# Patient Record
Sex: FEMALE | Race: AMERICAN INDIAN OR ALASKA NATIVE
[De-identification: names, ages, dates, MRNs, and addresses within clinical notes are randomized per-mention and may not be internally consistent; named-entity substitution may affect disease eponyms.]

---

## 2017-07-06 ENCOUNTER — HOSPITAL ENCOUNTER (EMERGENCY)
Dept: HOSPITAL 5 - ED | Age: 32
LOS: 1 days | Discharge: LEFT BEFORE DISCHARGE | End: 2017-07-07
Payer: SELF-PAY

## 2017-07-06 VITALS — SYSTOLIC BLOOD PRESSURE: 116 MMHG | DIASTOLIC BLOOD PRESSURE: 78 MMHG

## 2017-07-06 DIAGNOSIS — R10.9: Primary | ICD-10-CM

## 2017-07-06 DIAGNOSIS — M25.572: ICD-10-CM

## 2017-07-06 DIAGNOSIS — Z53.21: ICD-10-CM

## 2017-07-06 LAB
ALBUMIN SERPL-MCNC: 4.2 G/DL (ref 3.9–5)
ALBUMIN/GLOB SERPL: 1.4 %
ALP SERPL-CCNC: 61 UNITS/L (ref 35–129)
ALT SERPL-CCNC: 10 UNITS/L (ref 7–56)
ANION GAP SERPL CALC-SCNC: 16 MMOL/L
BASOPHILS NFR BLD AUTO: 0.8 % (ref 0–1.8)
BILIRUB SERPL-MCNC: < 0.2 MG/DL (ref 0.1–1.2)
BUN SERPL-MCNC: 12 MG/DL (ref 7–17)
BUN/CREAT SERPL: 15 %
CALCIUM SERPL-MCNC: 9.2 MG/DL (ref 8.4–10.2)
CHLORIDE SERPL-SCNC: 101.5 MMOL/L (ref 98–107)
CO2 SERPL-SCNC: 26 MMOL/L (ref 22–30)
EOSINOPHIL NFR BLD AUTO: 1.3 % (ref 0–4.3)
GLUCOSE SERPL-MCNC: 100 MG/DL (ref 65–100)
HCT VFR BLD CALC: 39.6 % (ref 30.3–42.9)
HGB BLD-MCNC: 12.7 GM/DL (ref 10.1–14.3)
LIPASE SERPL-CCNC: 44 UNITS/L (ref 13–60)
MCH RBC QN AUTO: 28 PG (ref 28–32)
MCHC RBC AUTO-ENTMCNC: 32 % (ref 30–34)
MCV RBC AUTO: 86 FL (ref 79–97)
PLATELET # BLD: 334 K/MM3 (ref 140–440)
POTASSIUM SERPL-SCNC: 4.1 MMOL/L (ref 3.6–5)
PROT SERPL-MCNC: 7.3 G/DL (ref 6.3–8.2)
RBC # BLD AUTO: 4.61 M/MM3 (ref 3.65–5.03)
SODIUM SERPL-SCNC: 139 MMOL/L (ref 137–145)
WBC # BLD AUTO: 5.1 K/MM3 (ref 4.5–11)

## 2017-07-06 PROCEDURE — 80053 COMPREHEN METABOLIC PANEL: CPT

## 2017-07-06 PROCEDURE — 85025 COMPLETE CBC W/AUTO DIFF WBC: CPT

## 2017-07-06 PROCEDURE — 93010 ELECTROCARDIOGRAM REPORT: CPT

## 2017-07-06 PROCEDURE — 83690 ASSAY OF LIPASE: CPT

## 2017-07-06 PROCEDURE — 36415 COLL VENOUS BLD VENIPUNCTURE: CPT

## 2017-07-06 PROCEDURE — 93005 ELECTROCARDIOGRAM TRACING: CPT

## 2017-07-07 NOTE — ED ELOPEMENT REVIEW
ED Pt Elopement review





- Results review


Lab results: 





 Laboratory Tests











  07/06/17 07/06/17





  16:54 16:54


 


WBC  5.1 


 


RBC  4.61 


 


Hgb  12.7 


 


Hct  39.6 


 


MCV  86 


 


MCH  28 


 


MCHC  32 


 


RDW  13.7 


 


Plt Count  334 


 


Lymph % (Auto)  42.0 H 


 


Mono % (Auto)  8.5 H 


 


Eos % (Auto)  1.3 


 


Baso % (Auto)  0.8 


 


Lymph #  2.1 


 


Mono #  0.4 


 


Eos #  0.1 


 


Baso #  0.0 


 


Seg Neutrophils %  47.4 


 


Seg Neutrophils #  2.4 


 


Carbon Dioxide   26


 


BUN   12


 


Creatinine   0.8


 


Estimated GFR   > 60


 


BUN/Creatinine Ratio   15.00


 


Glucose   100


 


Calcium   9.2


 


Total Bilirubin   < 0.20


 


AST   15


 


ALT   10


 


Alkaline Phosphatase   61


 


Total Protein   7.3


 


Albumin   4.2


 


Albumin/Globulin Ratio   1.4


 


Lipase   44














- Call Back decision


Pt Call Back Decision: Pt to F/U with PMD

## 2019-06-08 NOTE — EMERGENCY DEPARTMENT REPORT
Blank Doc





- Documentation


Documentation: 


34 y o female presents with chest pain x 30 mins ago


pt reports hx of similar symtom


reports anxiety no meds


chest pain localized


Ekg- was normal


LAbs, CXR 


ACC eval

## 2019-06-08 NOTE — XRAY REPORT
PROCEDURE:  XR CHEST ROUTINE 2V 

 

TECHNIQUE:  PA and lateral chest radiographs were obtained.  

 

HISTORY: Chest Pain 

 

COMPARISONS:  None. 

 

FINDINGS: 

 

Heart:  Normal. 

Mediastinum/Vessels: Normal. 

Lungs/Pleural space:  Normal. 

Bony thorax: No acute osseous abnormality. Mild scoliosis 

 

 

IMPRESSION:  No acute pulmonary disease. 

 

This document is electronically signed by Paul Martin MD., June 8 2019 05:54:33 PM ET

## 2019-06-08 NOTE — EMERGENCY DEPARTMENT REPORT
ED Chest Pain HPI





- General


Chief Complaint: Chest Pain


Stated Complaint: CHEST PAIN/PANIC/ANXIETY


Time Seen by Provider: 19 17:07


Source: patient


Mode of arrival: Ambulatory


Limitations: No Limitations





- History of Present Illness


Initial Comments: 


tp is a 34 y/ o AA female who presents with chest pain x 30 mins ago with sob 

and n/v rated at  5/10 left substernal for past 3 hr pt hx cp and anxiety, see 

for  same seen in this ed in   pain is sharp, exacerbated by 

inspiration , relieved by nothing , pain is exacerbating symptoms of anxiety, 

there is no SI no HI  with family members at bedside as support system pt now 

states anxiety is related to recent break up with girlfriend.  LMP today. 








MD Complaint: chest pain


Onset/Timin


-: hour(s)


Pain Location: substernal, left chest


Pain Radiation: none


Severity: moderate


Severity scale (0 -10): 10


Quality: sharp (IN the)


Consistency: constant


Improves With: nothing


Worsens With: other (stress )


re: nausea.  denies: vomting, diaphoresis, dyspnea, sense of impending doom


Other Symptoms: cough.  denies: fever, syncope, rash, acid taste in mouth, leg 

swelling, palpitations, burping


Treatments Prior to Arrival: none





- Related Data


On Oral Contraceptives: No


                                  Previous Rx's











 Medication  Instructions  Recorded  Last Taken  Type


 


Ibuprofen [Motrin 800 MG tab] 800 mg PO Q8HR PRN #30 tablet 19 Unknown Rx











                                    Allergies











Allergy/AdvReac Type Severity Reaction Status Date / Time


 


No Known Allergies Allergy   Verified 18 00:34














Heart Score





- HEART Score


History: Slightly suspicious


EKG: Normal


Age: < 45


Risk factors: No known risk factors


Troponin: < normal limit


HEART Score: 0





ED Review of Systems


ROS: 


Stated complaint: CHEST PAIN/PANIC/ANXIETY


Other details as noted in HPI





Constitutional: denies: chills, fever


Eyes: denies: eye pain, eye discharge, vision change


ENT: denies: ear pain, throat pain


Respiratory: other (left chest wall pain ).  denies: cough, shortness of breath,

wheezing


Cardiovascular: denies: chest pain, palpitations


Endocrine: no symptoms reported


Gastrointestinal: denies: abdominal pain, nausea, diarrhea


Genitourinary: denies: urgency, dysuria, discharge


Musculoskeletal: denies: back pain, joint swelling, arthralgia


Skin: denies: rash, lesions


Neurological: denies: headache, weakness, paresthesias


Psychiatric: anxiety.  denies: depression, homicidal thoughts, suicidal thoughts


Hematological/Lymphatic: denies: easy bleeding, easy bruising





ED Past Medical Hx





- Past Medical History


Previous Medical History?: No





- Surgical History


Past Surgical History?: No





- Social History


Smoking Status: Never Smoker


Substance Use Type: Alcohol





- Medications


Home Medications: 


                                Home Medications











 Medication  Instructions  Recorded  Confirmed  Last Taken  Type


 


Ibuprofen [Motrin 800 MG tab] 800 mg PO Q8HR PRN #30 tablet 19  Unknown Rx














ED Physical Exam





- General


Limitations: No Limitations


General appearance: alert, in no apparent distress





- Head


Head exam: Present: atraumatic, normocephalic





- Eye


Eye exam: Present: normal appearance, PERRL, EOMI


Pupils: Present: normal accommodation (ago)





- ENT


ENT exam: Present: normal orophraynx, mucous membranes moist





- Neck


Neck exam: Present: normal inspection, full ROM.  Absent: tenderness, meningi

smus, lymphadenopathy, thyromegaly





- Respiratory


Respiratory exam: Present: normal lung sounds bilaterally, chest wall tenderness

(left lateral chest pain to palpation no bruising no ecchymosis no crepitus ).  

Absent: respiratory distress, wheezes, stridor





- Cardiovascular


Cardiovascular Exam: Present: regular rate, normal rhythm, normal heart sounds. 

Absent: systolic murmur, diastolic murmur, rubs, gallop





- Expanded Cardiovascular Exam


  ** Expanded


Peripheral pulses: 2+: Carotid (R), Carotid (L), Radial (R), Radial (L), 

Dorsalis Pedis (R), Dorsalis Pedis (L)





- GI/Abdominal


GI/Abdominal exam: Present: soft, normal bowel sounds





- Rectal


Rectal exam: Present: deferred





- Extremities Exam


Extremities exam: Present: normal inspection, full ROM, normal capillary refill.

 Absent: tenderness





- Back Exam


Back exam: Present: normal inspection, full ROM.  Absent: tenderness, CVA 

tenderness (R), CVA tenderness (L), muscle spasm, paraspinal tenderness, 

vertebral tenderness, rash noted





- Neurological Exam


Neurological exam: Present: alert, oriented X3, CN II-XII intact, normal gait, 

reflexes normal





- Psychiatric


Psychiatric exam: Present: normal affect, normal mood, anxious.  Absent: 

depressed, agitated, manic, homicidal ideation, suicidal ideation





- Skin


Skin exam: Present: warm, dry, intact, normal color.  Absent: rash





ED Course


                                   Vital Signs











  19





  17:04 17:08


 


Temperature 98 F 


 


Pulse Rate 100 H 87


 


Respiratory 20 





Rate  


 


Blood Pressure 149/102 


 


O2 Sat by Pulse 100 





Oximetry  














FERNANDO score





- Fernando Score


Age > 65: (0) No


Aspirin use within the Past 7 Days: (0) No


3 or more CAD Risk Factors: (0) No


2 or more Angina events in past 24 hrs: (0) No


Known CAD with more than 50% Stenosis: (0) No


Elevated Cardiac Markers: (0) No


ST Deviation Greater than 0.5mm: (0) No


FERNANDO Score: 0





ED Medical Decision Making





- Lab Data


Result diagrams: 


                                 19 17:39





                                 19 17:39








Labs











  19





  17:39 17:39 17:39


 


WBC  4.8  


 


RBC  4.61  


 


Hgb  13.0  


 


Hct  38.9  


 


MCV  85  


 


MCH  28  


 


MCHC  33  


 


RDW  13.9  


 


Plt Count  357  


 


Lymph % (Auto)  37.9 H  


 


Mono % (Auto)  11.0 H  


 


Eos % (Auto)  0.4  


 


Baso % (Auto)  1.4  


 


Lymph #  1.8  


 


Mono #  0.5  


 


Eos #  0.0  


 


Baso #  0.1  


 


Seg Neutrophils %  49.3  


 


Seg Neutrophils #  2.4  


 


PT    14.4


 


INR    1.05


 


APTT    26.5


 


Sodium   142 


 


Potassium   3.3 L 


 


Chloride   103.3 


 


Carbon Dioxide   24 


 


Anion Gap   18 


 


BUN   7 


 


Creatinine   0.8 


 


Estimated GFR   > 60 


 


BUN/Creatinine Ratio   9 


 


Glucose   85 


 


Calcium   9.4 


 


Troponin T   < 0.010 


 


HCG, Qual   


 


Urine Color   


 


Urine Turbidity   


 


Urine pH   


 


Ur Specific Gravity   


 


Urine Protein   


 


Urine Glucose (UA)   


 


Urine Ketones   


 


Urine Blood   


 


Urine Nitrite   


 


Urine Bilirubin   


 


Urine Urobilinogen   


 


Ur Leukocyte Esterase   


 


Urine WBC (Auto)   


 


Urine RBC (Auto)   


 


U Epithel Cells (Auto)   


 


Urine Bacteria (Auto)   


 


Urine Mucus   














  19





  17:39 17:40


 


WBC  


 


RBC  


 


Hgb  


 


Hct  


 


MCV  


 


MCH  


 


MCHC  


 


RDW  


 


Plt Count  


 


Lymph % (Auto)  


 


Mono % (Auto)  


 


Eos % (Auto)  


 


Baso % (Auto)  


 


Lymph #  


 


Mono #  


 


Eos #  


 


Baso #  


 


Seg Neutrophils %  


 


Seg Neutrophils #  


 


PT  


 


INR  


 


APTT  


 


Sodium  


 


Potassium  


 


Chloride  


 


Carbon Dioxide  


 


Anion Gap  


 


BUN  


 


Creatinine  


 


Estimated GFR  


 


BUN/Creatinine Ratio  


 


Glucose  


 


Calcium  


 


Troponin T  


 


HCG, Qual  Negative 


 


Urine Color   Alisha


 


Urine Turbidity   Cloudy


 


Urine pH   5.0


 


Ur Specific Gravity   1.021


 


Urine Protein   30 mg/dl


 


Urine Glucose (UA)   Neg


 


Urine Ketones   20


 


Urine Blood   Lg


 


Urine Nitrite   Neg


 


Urine Bilirubin   Neg


 


Urine Urobilinogen   < 2.0


 


Ur Leukocyte Esterase   Sm


 


Urine WBC (Auto)   21.0 H


 


Urine RBC (Auto)   19.0


 


U Epithel Cells (Auto)   8.0


 


Urine Bacteria (Auto)   4+


 


Urine Mucus   3+








Also had a





- EKG Data


EKG shows normal: sinus rhythm, axis, intervals, QRS complexes, ST-T waves


Rate: normal





- EKG Data


When compared to previous EKG there are: previous EKG unavailable


Interpretation: normal EKG (EKG interp by ed attending, NSR No ST Elevated MI )





- Radiology Data


Radiology results: report reviewed, image reviewed





Ordering Physician: DONNY DODGE 


Date of Service: 19 


Procedure(s): XR chest routine 2V 


Accession Number(s): [id number] 





cc: DONNY DODGE 





Fluoro Time In Minutes: 





PROCEDURE: XR CHEST ROUTINE 2V 





TECHNIQUE: PA and lateral chest radiographs were obtained. 





HISTORY: Chest Pain 





COMPARISONS: None. 





FINDINGS: 





Heart: Normal. 


Mediastinum/Vessels: Normal. 


Lungs/Pleural space: Normal. 


Bony thorax: No acute osseous abnormality. Mild scoliosis 








IMPRESSION: No acute pulmonary disease. 





This document is electronically signed by Paul Martin MD., 2019 05:54:33

PM ET 





Transcribed By: HJ 


Dictated By: PAUL MARTIN MD 


Electronically Authenticated By: PAUL MARTIN MD 


Signed Date/Time: 19 











DD/DT: 19 


TD/TT: 19








- Medical Decision Making


cp is resolved to 1/10 with ibuprofen given ED, EKG NSR no ST elevated MI, trop:

<0.01 cxr normal, cbc&bmp are normal, ua: pos for leuk and wbc  pt with current 

menses, the re is no sob no n/v , pt is tolerating po intake, pt appears well 

non toxic resp are normal nonlabor lung sounds are clear throught,  Heart score:

0, FERNANDO score: 0,    WELLS PE score is : 0 pt will follow up with pcp in 2-3 

days , psychiatry in 2 days for psych medication refill, pt verbalized agreement

and understanding of discharge plan.  pt to home with nad at this time


Critical care attestation.: 


If time is entered above; I have spent that time in minutes in the direct care 

of this critically ill patient, excluding procedure time.








ED Disposition


Clinical Impression: 


 Chest wall pain, Stress due to family tension





Disposition:  TO HOME OR SELFCARE


Is pt being admited?: No


Does the pt Need Aspirin: No


Condition: Stable


Instructions:  Chest Pain (ED), Stress (ED)


Prescriptions: 


Ibuprofen [Motrin 800 MG tab] 800 mg PO Q8HR PRN #30 tablet


 PRN Reason: pain


Referrals: 


[facility] [Outside] - 2-3 Days


[facility] [Primary Care Provider] - 2-3 Days


Forms:  Work/School Release Form(ED)


Time of Disposition: 19:20